# Patient Record
Sex: FEMALE | Race: BLACK OR AFRICAN AMERICAN | NOT HISPANIC OR LATINO | URBAN - METROPOLITAN AREA
[De-identification: names, ages, dates, MRNs, and addresses within clinical notes are randomized per-mention and may not be internally consistent; named-entity substitution may affect disease eponyms.]

---

## 2020-11-19 ENCOUNTER — OFFICE VISIT (OUTPATIENT)
Dept: URBAN - METROPOLITAN AREA CLINIC 17 | Facility: CLINIC | Age: 60
End: 2020-11-19

## 2021-01-28 ENCOUNTER — OFFICE VISIT (OUTPATIENT)
Dept: URBAN - METROPOLITAN AREA CLINIC 17 | Facility: CLINIC | Age: 61
End: 2021-01-28

## 2021-02-01 ENCOUNTER — OFFICE VISIT (OUTPATIENT)
Dept: URBAN - METROPOLITAN AREA TELEHEALTH 2 | Facility: TELEHEALTH | Age: 61
End: 2021-02-01
Payer: SELF-PAY

## 2021-02-01 ENCOUNTER — TELEPHONE ENCOUNTER (OUTPATIENT)
Dept: URBAN - METROPOLITAN AREA CLINIC 92 | Facility: CLINIC | Age: 61
End: 2021-02-01

## 2021-02-01 DIAGNOSIS — D50.9 IRON DEFICIENCY ANEMIA: ICD-10-CM

## 2021-02-01 DIAGNOSIS — K74.60 CIRRHOSIS: ICD-10-CM

## 2021-02-01 DIAGNOSIS — K74.69 CIRRHOSIS, CRYPTOGENIC: ICD-10-CM

## 2021-02-01 DIAGNOSIS — D50.8 ANEMIA, DUE TO INADEQUATE IRON INTAKE: ICD-10-CM

## 2021-02-01 PROBLEM — 87522002 IRON DEFICIENCY ANEMIA: Status: ACTIVE | Noted: 2021-02-01

## 2021-02-01 PROCEDURE — 99252 IP/OBS CONSLTJ NEW/EST SF 35: CPT | Performed by: INTERNAL MEDICINE

## 2021-02-01 PROCEDURE — 99202 OFFICE O/P NEW SF 15 MIN: CPT | Performed by: INTERNAL MEDICINE

## 2021-02-01 RX ORDER — FUROSEMIDE 20 MG/1
TABLET ORAL
Qty: 0 | Refills: 0 | Status: DISCONTINUED | COMMUNITY
Start: 1900-01-01

## 2021-02-01 RX ORDER — LOPERAMIDE HYDROCHLORIDE 2 MG/1
CAPSULE ORAL
Qty: 24 UNSPECIFIED | Status: ACTIVE | COMMUNITY

## 2021-02-01 RX ORDER — PROPRANOLOL HYDROCHLORIDE 10 MG/1
TABLET ORAL
Qty: 60 UNSPECIFIED | Status: ACTIVE | COMMUNITY

## 2021-02-01 RX ORDER — MEGESTROL ACETATE 40 MG/1
TABLET ORAL
Qty: 0 | Refills: 0 | Status: DISCONTINUED | COMMUNITY
Start: 1900-01-01

## 2021-02-01 RX ORDER — FLUCONAZOLE 150 MG/1
TABLET ORAL
Qty: 1 UNSPECIFIED | Status: ACTIVE | COMMUNITY

## 2021-02-01 RX ORDER — ERGOCALCIFEROL 1.25 MG/1
CAPSULE ORAL
Qty: 0 | Refills: 0 | Status: DISCONTINUED | COMMUNITY
Start: 1900-01-01

## 2021-02-01 RX ORDER — AMLODIPINE BESYLATE 10 MG/1
TABLET ORAL
Qty: 30 UNSPECIFIED | Status: ACTIVE | COMMUNITY

## 2021-02-01 RX ORDER — NYSTATIN 100000 [USP'U]/G
CREAM TOPICAL
Qty: 30 UNSPECIFIED | Status: ACTIVE | COMMUNITY

## 2021-02-01 NOTE — HPI-OTHER HISTORIES
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 Pt continues to abstain from alcohol, with last intake over 6 months ago. She is feeling well without any new GI complaints. Her appetite is improved, + weight gain. Denies jaundice, gi bleeding, ascites/le edema, or abd pain.  Previous colonoscopy and egd from last year reviewed (no significant findings, no varices seen).

## 2021-02-01 NOTE — HPI-TODAY'S VISIT:
This is a 59 yo female referred for anemia.  She has been admitted to [facility] for the past 12 months for weakness and fatigue.  She was found to have low blood count and has gotten a blood transfusion each time. The patient has not been seen by a hepatologist.  She was diagnosed with ETOH cirrhoisis a few years ago.  Her last ETOH beverage was in December when she last her son who was murdered during a robbery.   Her last EGD and colonoscopy was at least 5 years ago.   She also has a history of ascites.   She currently denies abdominal swelling and edema.

## 2021-02-25 ENCOUNTER — TELEPHONE ENCOUNTER (OUTPATIENT)
Dept: URBAN - METROPOLITAN AREA CLINIC 92 | Facility: CLINIC | Age: 61
End: 2021-02-25

## 2021-02-25 ENCOUNTER — OFFICE VISIT (OUTPATIENT)
Dept: URBAN - METROPOLITAN AREA CLINIC 16 | Facility: CLINIC | Age: 61
End: 2021-02-25
Payer: SELF-PAY

## 2021-02-25 DIAGNOSIS — K74.69 CIRRHOSIS, CRYPTOGENIC: ICD-10-CM

## 2021-02-25 DIAGNOSIS — K76.0 FATTY (CHANGE OF) LIVER, NOT ELSEWHERE CLASSIFIED: ICD-10-CM

## 2021-02-25 PROBLEM — 420054005: Status: ACTIVE | Noted: 2021-02-25

## 2021-02-25 PROCEDURE — 76700 US EXAM ABDOM COMPLETE: CPT | Performed by: INTERNAL MEDICINE

## 2021-02-25 RX ORDER — AMLODIPINE BESYLATE 10 MG/1
TABLET ORAL
Qty: 30 UNSPECIFIED | Status: ACTIVE | COMMUNITY

## 2021-02-25 RX ORDER — LOPERAMIDE HYDROCHLORIDE 2 MG/1
CAPSULE ORAL
Qty: 24 UNSPECIFIED | Status: ACTIVE | COMMUNITY

## 2021-02-25 RX ORDER — NYSTATIN 100000 [USP'U]/G
CREAM TOPICAL
Qty: 30 UNSPECIFIED | Status: ACTIVE | COMMUNITY

## 2021-02-25 RX ORDER — FLUCONAZOLE 150 MG/1
TABLET ORAL
Qty: 1 UNSPECIFIED | Status: ACTIVE | COMMUNITY

## 2021-02-25 RX ORDER — PROPRANOLOL HYDROCHLORIDE 10 MG/1
TABLET ORAL
Qty: 60 UNSPECIFIED | Status: ACTIVE | COMMUNITY

## 2021-03-05 ENCOUNTER — TELEPHONE ENCOUNTER (OUTPATIENT)
Dept: URBAN - METROPOLITAN AREA CLINIC 17 | Facility: CLINIC | Age: 61
End: 2021-03-05

## 2021-03-11 LAB
AFP, SERUM, TUMOR MARKER: 4
ALPHA 2-MACROGLOBULINS, QN: (no result)
ALT (SGPT) P5P: (no result)
APOLIPOPROTEIN A-1: (no result)
BILIRUBIN, TOTAL: (no result)
COMMENT:: (no result)
COMMENT:: (no result)
FIBROSIS SCORE: (no result)
FIBROSIS SCORING:: (no result)
FIBROSIS STAGE: (no result)
FOLATE (FOLIC ACID), SERUM: 3.7
GGT: (no result)
HAPTOGLOBIN: (no result)
HBSAG SCREEN: NEGATIVE
HEMATOCRIT: 20.6
HEMOGLOBIN: 5.1
HEP A AB, TOTAL: NEGATIVE
HEP B SURFACE AB, QUAL: REACTIVE
HEP C VIRUS AB: 0.1
INR: 1.2
INTERPRETATIONS:: (no result)
IRON: 10
LIMITATIONS:: (no result)
MCH: 18
MCHC: 24.8
MCV: 73
NECROINFLAMM ACTIVITY SCORING:: (no result)
NECROINFLAMMAT ACTIVITY GRADE: (no result)
NECROINFLAMMAT ACTIVITY SCORE: (no result)
NRBC: 1
PLATELETS: 341
PROTHROMBIN TIME: 12.4
RBC: 2.83
RDW: 17.7
REQUEST PROBLEM: (no result)
VIT. B1, WHOLE BLOOD: 52.2
VITAMIN B12: 1170
WBC: 6.4

## 2021-03-17 ENCOUNTER — TELEPHONE ENCOUNTER (OUTPATIENT)
Dept: URBAN - METROPOLITAN AREA CLINIC 17 | Facility: CLINIC | Age: 61
End: 2021-03-17

## 2021-03-18 ENCOUNTER — TELEPHONE ENCOUNTER (OUTPATIENT)
Dept: URBAN - METROPOLITAN AREA CLINIC 92 | Facility: CLINIC | Age: 61
End: 2021-03-18

## 2021-04-09 ENCOUNTER — TELEPHONE ENCOUNTER (OUTPATIENT)
Dept: URBAN - METROPOLITAN AREA CLINIC 92 | Facility: CLINIC | Age: 61
End: 2021-04-09

## 2023-03-02 ENCOUNTER — DASHBOARD ENCOUNTERS (OUTPATIENT)
Age: 63
End: 2023-03-02

## 2023-03-07 ENCOUNTER — OFFICE VISIT (OUTPATIENT)
Dept: URBAN - METROPOLITAN AREA CLINIC 52 | Facility: CLINIC | Age: 63
End: 2023-03-07

## 2023-03-07 RX ORDER — AMLODIPINE BESYLATE 10 MG/1
TABLET ORAL
Qty: 30 UNSPECIFIED | COMMUNITY

## 2023-03-07 RX ORDER — NYSTATIN 100000 [USP'U]/G
CREAM TOPICAL
Qty: 30 UNSPECIFIED | COMMUNITY

## 2023-03-07 RX ORDER — PROPRANOLOL HYDROCHLORIDE 10 MG/1
TABLET ORAL
Qty: 60 UNSPECIFIED | COMMUNITY

## 2023-03-07 RX ORDER — FLUCONAZOLE 150 MG/1
TABLET ORAL
Qty: 1 UNSPECIFIED | COMMUNITY

## 2023-03-07 RX ORDER — LOPERAMIDE HYDROCHLORIDE 2 MG/1
CAPSULE ORAL
Qty: 24 UNSPECIFIED | COMMUNITY

## 2023-04-04 ENCOUNTER — OFFICE VISIT (OUTPATIENT)
Dept: URBAN - METROPOLITAN AREA CLINIC 52 | Facility: CLINIC | Age: 63
End: 2023-04-04